# Patient Record
Sex: MALE | URBAN - METROPOLITAN AREA
[De-identification: names, ages, dates, MRNs, and addresses within clinical notes are randomized per-mention and may not be internally consistent; named-entity substitution may affect disease eponyms.]

---

## 2022-08-12 ENCOUNTER — TELEPHONE (OUTPATIENT)
Dept: UROLOGY | Facility: CLINIC | Age: 32
End: 2022-08-12
Payer: COMMERCIAL

## 2022-08-17 DIAGNOSIS — Z30.09 VASECTOMY EVALUATION: Primary | ICD-10-CM

## 2025-07-21 ENCOUNTER — OFFICE VISIT (OUTPATIENT)
Dept: SURGERY | Facility: CLINIC | Age: 35
End: 2025-07-21
Payer: OTHER GOVERNMENT

## 2025-07-21 DIAGNOSIS — K64.5 THROMBOSED EXTERNAL HEMORRHOID: Primary | ICD-10-CM

## 2025-07-21 NOTE — PROCEDURES
Incision & Drainage thrombosed external hemorrhoid    Date/Time: 7/21/2025 1:30 PM    Performed by: Mayank Mendez MD  Authorized by: Mayank Mendez MD      Type:  Hematoma (External hemorrhoid)  Body area:  Anogenital (Anus)  Location details:  Perianal  Anesthesia:  Local infiltration  Local anesthetic: Lidocaine 1% with epinephrine  Scalpel size:  15  Incision type:  Single straight  Complexity:  Simple  Drainage:  Bloody  Wound treatment:  Wound left open  Packing material:  None  Patient tolerance:  Patient tolerated the procedure well with no immediate complications

## 2025-07-21 NOTE — PROGRESS NOTES
History & Physical    SUBJECTIVE:     History of Present Illness:    35-year-old with 2 week history of a firm knot right anal canal causing tenderness.  He has had this before but usually it would go away.  This 1 does not appear to be going away despite conservative measures.  He complains of tenderness in the anal canal.    Chief Complaint   Patient presents with    Consult     hemorrhoids         Review of patient's allergies indicates:  Review of patient's allergies indicates:  No Known Allergies    Medications Ordered Prior to Encounter[1]    History reviewed. No pertinent past medical history.  Past Surgical History:   Procedure Laterality Date    TONSILLECTOMY       No family history on file.    Social History[2]       ROS    OBJECTIVE:     There were no vitals filed for this visit.              Physical Exam:  Physical Exam  Cardiovascular:      Rate and Rhythm: Normal rate and regular rhythm.   Pulmonary:      Effort: Pulmonary effort is normal.   Abdominal:      General: Abdomen is flat. Bowel sounds are normal.      Palpations: Abdomen is soft.   Genitourinary:         Comments: With patient supine on right side 9:00 a.m. is a 1.5 cm thrombosed external hemorrhoid with tenderness.  Skin:     General: Skin is warm and moist.   Neurological:      Mental Status: He is alert and oriented to person, place, and time. Mental status is at baseline.   Psychiatric:         Attention and Perception: Attention and perception normal.         Speech: Speech normal.         Behavior: Behavior normal.             ASSESSMENT/PLAN:   1.5 cm thrombosed right external hemorrhoid  PLAN:  We will plan for incision and drainage of thrombosed external hemorrhoid today in the office.  See procedure note               [1]   No current outpatient medications on file prior to visit.     No current facility-administered medications on file prior to visit.   [2]   Social History  Socioeconomic History    Marital status:     Tobacco Use    Smoking status: Some Days     Types: Cigarettes    Smokeless tobacco: Never